# Patient Record
Sex: FEMALE | Race: WHITE | Employment: FULL TIME | URBAN - METROPOLITAN AREA
[De-identification: names, ages, dates, MRNs, and addresses within clinical notes are randomized per-mention and may not be internally consistent; named-entity substitution may affect disease eponyms.]

---

## 2022-10-11 NOTE — PROGRESS NOTES
Annual exam ages 40-58      Candido Wen is a ,  56 y.o. female   No LMP recorded. 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 With regard to the Gardasil vaccine, she is older than the FDA approved age to receive it. Menstrual status:    Her periods are normal in flow. She is using one to three pads or tampons per day, usually regular with a 26-32 day interval with 3-7 day duration. She does not have dysmenorrhea. She reports no premenstrual symptoms. Sounds perimenopausal--does not want medication for that. Contraception:    The current method of family planning is none. Hormonal status:  She reports no perimenstrual type symptoms. She is not having vasomotor symptoms. The patient is not using any ERT. Sexual history:    She  reports being sexually active and has had partner(s) who are male. She reports using the following method of birth control/protection: Surgical.    Medical conditions:    New patient first AE visit in office     Surgical history confirmed with patient. has a past surgical history that includes pr cardiac surg procedure unlist (2012); hx  section; hx tubal ligation; hx tonsillectomy; and hx heent. Pap and Mammogram History:    Her most recent Pap smear was normal, obtained 2 year(s) ago. The patient had her mammogram today in our office. Breast Cancer History/Substance Abuse: negative      Osteoporosis History:    Family history does not include a first or second degree relative with osteopenia or osteoporosis.         Past Medical History:   Diagnosis Date    Anxiety     Arrhythmia     PSVT    GERD (gastroesophageal reflux disease)     Pneumonia      Past Surgical History:   Procedure Laterality Date    HX  SECTION      x 2    HX HEENT      wisdom teeth    HX TONSILLECTOMY      HX TUBAL LIGATION      [place] CARDIAC SURG PROCEDURE UNLIST  2012    ablation Current Outpatient Medications   Medication Sig Dispense Refill    aspirin (ASPIRIN) 325 mg tablet Take 1 Tab by mouth every Monday and Thursday. Indications: MYOCARDIAL INFARCTION, PREVENTION OF TRANSIENT ISCHEMIC ATTACKS 999 Tab 999    mesalamine EC (ASACOL) 400 mg EC tablet Take 3 Tabs by mouth three (3) times daily. 270 Tab 1    Desvenlafaxine SR (PRISTIQ) 50 mg tablet Take 50 mg by mouth daily. ALPRAZolam (XANAX) 0.25 mg tablet Take 0.25 mg by mouth two (2) times daily as needed. esomeprazole (NEXIUM) 40 mg capsule Take 40 mg by mouth daily. Allergies: Patient has no known allergies. Tobacco History:  reports that she has never smoked. She has never used smokeless tobacco.  Alcohol Abuse:  reports current alcohol use. Drug Abuse:  reports no history of drug use. Family Medical/Cancer History:   Family History   Problem Relation Age of Onset    Other Mother         colitis    Heart Disease Father     Stroke Father         Review of Systems - History obtained from the patient  Constitutional: negative for weight loss, fever, night sweats  HEENT: negative for hearing loss, earache, congestion, snoring, sorethroat  CV: negative for chest pain, palpitations, edema  Resp: negative for cough, shortness of breath, wheezing  GI: negative for change in bowel habits, abdominal pain, black or bloody stools  : negative for frequency, dysuria, hematuria, vaginal discharge  MSK: negative for back pain, joint pain, muscle pain  Breast: negative for breast lumps, nipple discharge, galactorrhea  Skin :negative for itching, rash, hives  Neuro: negative for dizziness, headache, confusion, weakness  Psych: negative for anxiety, depression, change in mood  Heme/lymph: negative for bleeding, bruising, pallor    Physical Exam    There were no vitals taken for this visit.     Constitutional  Appearance: well-nourished, well developed, alert, in no acute distress    HENT  Head and Face: appears normal    Neck  Inspection/Palpation: normal appearance, no masses or tenderness  Lymph Nodes: no lymphadenopathy present  Thyroid: gland size normal, nontender, no nodules or masses present on palpation    Chest  Respiratory Effort: breathing unlabored  Auscultation: normal breath sounds    Cardiovascular  Heart:   Auscultation: regular rate and rhythm without murmur    Breasts  Inspection of Breasts: breasts symmetrical, no skin changes, no discharge present, nipple appearance normal, no skin retraction present  Palpation of Breasts and Axillae: no masses present on palpation, no breast tenderness  Axillary Lymph Nodes: no lymphadenopathy present    Gastrointestinal  Abdominal Examination: abdomen non-tender to palpation, normal bowel sounds, no masses present  Liver and spleen: no hepatomegaly present, spleen not palpable  Hernias: no hernias identified    Genitourinary  External Genitalia: normal appearance for age, no discharge present, no tenderness present, no inflammatory lesions present, no masses present, no atrophy present  Vagina: normal vaginal vault without central or paravaginal defects, no discharge present, no inflammatory lesions present, no masses present  Bladder: non-tender to palpation  Urethra: appears normal  Cervix: normal   Uterus: normal size, shape and consistency  Adnexa: no adnexal tenderness present, no adnexal masses present  Perineum: perineum within normal limits, no evidence of trauma, no rashes or skin lesions present  Anus: anus within normal limits, no hemorrhoids present  Inguinal Lymph Nodes: no lymphadenopathy present    Skin  General Inspection: no rash, no lesions identified    Neurologic/Psychiatric  Mental Status:  Orientation: grossly oriented to person, place and time  Mood and Affect: mood normal, affect appropriate    Assessment:  Routine gynecologic examination  Her current medical status is satisfactory with no evidence of significant gynecologic issues.     Plan:  Counseled re: diet, exercise, healthy lifestyle  Return for yearly wellness visits  Rec annual mammogram

## 2022-10-14 ENCOUNTER — OFFICE VISIT (OUTPATIENT)
Dept: OBGYN CLINIC | Age: 47
End: 2022-10-14

## 2022-10-14 VITALS — WEIGHT: 169.2 LBS | SYSTOLIC BLOOD PRESSURE: 144 MMHG | DIASTOLIC BLOOD PRESSURE: 82 MMHG

## 2022-10-14 DIAGNOSIS — Z01.419 ENCOUNTER FOR GYNECOLOGICAL EXAMINATION WITHOUT ABNORMAL FINDING: Primary | ICD-10-CM

## 2022-10-14 DIAGNOSIS — Z12.4 ENCOUNTER FOR SCREENING FOR CERVICAL CANCER: ICD-10-CM

## 2022-10-14 PROCEDURE — 99396 PREV VISIT EST AGE 40-64: CPT | Performed by: OBSTETRICS & GYNECOLOGY

## 2022-10-14 RX ORDER — OMEPRAZOLE 20 MG/1
20 TABLET, DELAYED RELEASE ORAL DAILY
COMMUNITY

## 2022-10-14 RX ORDER — LOSARTAN POTASSIUM 25 MG/1
TABLET ORAL
COMMUNITY
Start: 2022-07-26

## 2022-10-14 RX ORDER — METOPROLOL SUCCINATE 50 MG/1
TABLET, EXTENDED RELEASE ORAL
COMMUNITY
Start: 2022-09-24

## 2022-10-14 RX ORDER — ESCITALOPRAM OXALATE 20 MG/1
TABLET ORAL
COMMUNITY
Start: 2022-07-26

## 2023-11-28 ENCOUNTER — OFFICE VISIT (OUTPATIENT)
Age: 48
End: 2023-11-28
Payer: COMMERCIAL

## 2023-11-28 VITALS — DIASTOLIC BLOOD PRESSURE: 90 MMHG | SYSTOLIC BLOOD PRESSURE: 160 MMHG | WEIGHT: 149.4 LBS

## 2023-11-28 DIAGNOSIS — Z01.419 ENCOUNTER FOR GYNECOLOGICAL EXAMINATION (GENERAL) (ROUTINE) WITHOUT ABNORMAL FINDINGS: Primary | ICD-10-CM

## 2023-11-28 DIAGNOSIS — Z12.4 ENCOUNTER FOR PAPANICOLAOU SMEAR FOR CERVICAL CANCER SCREENING: ICD-10-CM

## 2023-11-28 PROCEDURE — 99396 PREV VISIT EST AGE 40-64: CPT | Performed by: OBSTETRICS & GYNECOLOGY

## 2023-11-28 RX ORDER — VITAMIN B COMPLEX
1 CAPSULE ORAL DAILY
COMMUNITY

## 2023-11-28 RX ORDER — ASPIRIN 81 MG/1
81 TABLET, CHEWABLE ORAL DAILY
COMMUNITY

## 2023-11-28 RX ORDER — CLOBETASOL PROPIONATE 0.5 MG/G
CREAM TOPICAL 2 TIMES DAILY
COMMUNITY

## 2023-11-28 NOTE — PROGRESS NOTES
Miryam James is a 53 y.o. female returns for an annual exam     No chief complaint on file. No LMP recorded. Her periods are absent in flow   Problems: no problems  Birth Control: tubal ligation. Last Pap: normal obtained 1 year(s) ago. She does not have a history of SEYMOUR 2, 3 or cervical cancer. Last Mammogram: had her mammogram today in our office. Last colonoscopy: normal obtained 6 year(s) ago. 1. Have you been to the ER, urgent care clinic, or hospitalized since your last visit? No    2. Have you seen or consulted any other health care providers outside of the [address] [place] [address] since your last visit? No    Examination chaperoned by Hartford Skiff, LPN.
escitalopram oxalate (LEXAPRO) 20 mg tablet (Patient not taking: Reported on 11/28/2023)      losartan (COZAAR) 25 MG tablet ceived the following from [address] [facility]: Outside name: losartan (COZAAR) 25 mg tablet (Patient not taking: Reported on 11/28/2023)      omeprazole (PRILOSEC OTC) 20 MG tablet Take 20 mg by mouth daily (Patient not taking: Reported on 11/28/2023)       No current facility-administered medications for this visit. Allergies: Augmentin [amoxicillin-pot clavulanate] and Doxycycline     Tobacco History:  reports that she has never smoked. She has never used smokeless tobacco.  Alcohol Abuse:  reports current alcohol use. Drug Abuse:  reports no history of drug use.     Family Medical/Cancer History:   Family History   Problem Relation Age of Onset    Other Mother         colitis    Stroke Father     Heart Disease Father         Review of Systems - History obtained from the patient  Constitutional: negative for weight loss, fever, night sweats  HEENT: negative for hearing loss, earache, congestion, snoring, sorethroat  CV: negative for chest pain, palpitations, edema  Resp: negative for cough, shortness of breath, wheezing  GI: negative for change in bowel habits, abdominal pain, black or bloody stools  : negative for frequency, dysuria, hematuria, vaginal discharge  MSK: negative for back pain, joint pain, muscle pain  Breast: negative for breast lumps, nipple discharge, galactorrhea  Skin :negative for itching, rash, hives  Neuro: negative for dizziness, headache, confusion, weakness  Psych: negative for anxiety, depression, change in mood  Heme/lymph: negative for bleeding, bruising, pallor    Physical Exam    BP (!) 160/90   Wt 67.8 kg (149 lb 6.4 oz)     Constitutional  Appearance: well-nourished, well developed, alert, in no acute distress    HENT  Head and Face: appears normal    Neck  Inspection/Palpation: normal appearance, no masses or tenderness  Lymph Nodes: no

## 2023-12-02 LAB
CYTOLOGIST CVX/VAG CYTO: NORMAL
CYTOLOGY CVX/VAG DOC CYTO: NORMAL
CYTOLOGY CVX/VAG DOC THIN PREP: NORMAL
DX ICD CODE: NORMAL
HPV GENOTYPE REFLEX: NORMAL
HPV I/H RISK 4 DNA CVX QL PROBE+SIG AMP: NEGATIVE
Lab: NORMAL
OTHER STN SPEC: NORMAL
STAT OF ADQ CVX/VAG CYTO-IMP: NORMAL

## 2024-12-04 ENCOUNTER — OFFICE VISIT (OUTPATIENT)
Age: 49
End: 2024-12-04
Payer: COMMERCIAL

## 2024-12-04 VITALS
OXYGEN SATURATION: 94 % | BODY MASS INDEX: 23.92 KG/M2 | SYSTOLIC BLOOD PRESSURE: 133 MMHG | RESPIRATION RATE: 16 BRPM | DIASTOLIC BLOOD PRESSURE: 78 MMHG | TEMPERATURE: 97.6 F | WEIGHT: 135 LBS | HEIGHT: 63 IN | HEART RATE: 85 BPM

## 2024-12-04 DIAGNOSIS — Z12.31 ENCOUNTER FOR SCREENING MAMMOGRAM FOR MALIGNANT NEOPLASM OF BREAST: ICD-10-CM

## 2024-12-04 DIAGNOSIS — Z01.419 ENCOUNTER FOR GYNECOLOGICAL EXAMINATION (GENERAL) (ROUTINE) WITHOUT ABNORMAL FINDINGS: Primary | ICD-10-CM

## 2024-12-04 PROCEDURE — 99396 PREV VISIT EST AGE 40-64: CPT | Performed by: OBSTETRICS & GYNECOLOGY

## 2024-12-04 RX ORDER — MESALAMINE 1.2 G/1
1200 TABLET, DELAYED RELEASE ORAL
COMMUNITY

## 2024-12-04 RX ORDER — DULOXETIN HYDROCHLORIDE 30 MG/1
30 CAPSULE, DELAYED RELEASE ORAL DAILY
COMMUNITY

## 2024-12-04 SDOH — ECONOMIC STABILITY: INCOME INSECURITY: HOW HARD IS IT FOR YOU TO PAY FOR THE VERY BASICS LIKE FOOD, HOUSING, MEDICAL CARE, AND HEATING?: NOT HARD AT ALL

## 2024-12-04 SDOH — ECONOMIC STABILITY: FOOD INSECURITY: WITHIN THE PAST 12 MONTHS, YOU WORRIED THAT YOUR FOOD WOULD RUN OUT BEFORE YOU GOT MONEY TO BUY MORE.: NEVER TRUE

## 2024-12-04 SDOH — ECONOMIC STABILITY: FOOD INSECURITY: WITHIN THE PAST 12 MONTHS, THE FOOD YOU BOUGHT JUST DIDN'T LAST AND YOU DIDN'T HAVE MONEY TO GET MORE.: NEVER TRUE

## 2024-12-04 ASSESSMENT — PATIENT HEALTH QUESTIONNAIRE - PHQ9
SUM OF ALL RESPONSES TO PHQ QUESTIONS 1-9: 0
1. LITTLE INTEREST OR PLEASURE IN DOING THINGS: NOT AT ALL
SUM OF ALL RESPONSES TO PHQ9 QUESTIONS 1 & 2: 0
SUM OF ALL RESPONSES TO PHQ QUESTIONS 1-9: 0
SUM OF ALL RESPONSES TO PHQ QUESTIONS 1-9: 0
2. FEELING DOWN, DEPRESSED OR HOPELESS: NOT AT ALL
SUM OF ALL RESPONSES TO PHQ QUESTIONS 1-9: 0

## 2024-12-04 NOTE — PROGRESS NOTES
Autumn Hebert is a 49 y.o. female returns for an annual exam     No chief complaint on file.      No LMP recorded. (Menstrual status: Other - See Notes).  Her periods are absent-post ablation  She does not have dysmenorrhea.  Problems: no problems  Birth Control: tubal ligation.  Last Pap: normal obtained 1 year(s) ago.  She does not have a history of SEYMOUR 2, 3 or cervical cancer.   Last Mammogram: had her mammogram today in our office today.  2023 Mammonormal.   Last Bone Density: Never had obtained   Last colonoscopy: 2024 repeat 5 years    1. Have you been to the ER, urgent care clinic, or hospitalized since your last visit? No    2. Have you seen or consulted any other health care providers outside of the [facility] since your last visit? No    Examination chaperoned by Britta Smith LPN.

## 2024-12-04 NOTE — PROGRESS NOTES
Annual exam      Autumn Hebert is a ,  49 y.o. female   No LMP recorded (lmp unknown). (Menstrual status: Other - See Notes).    She presents for her annual checkup.     She is having no problems.    Menstrual status:    Her periods are absent-post menopause    Sexual history:    She  has no history on file for sexual activity.    Per Nursing Note:  Last Pap: normal obtained 1 year(s) ago.  She does not have a history of SEYMOUR 2, 3 or cervical cancer.   Last Mammogram: had her mammogram today in our office today.   Mammonormal.   Last Bone Density: Never had obtained   Last colonoscopy:  repeat 5 years    Past Medical History:   Diagnosis Date    Anxiety     Arrhythmia 2012    PSVT    GERD (gastroesophageal reflux disease)     Hypertension     Pneumonia      Past Surgical History:   Procedure Laterality Date     SECTION      x 2    HEENT      wisdom teeth    [place] UNLISTED PROCEDURE CARDIAC SURGERY  2012    ablation    TONSILLECTOMY      TUBAL LIGATION         Current Outpatient Medications   Medication Sig Dispense Refill    DULoxetine (CYMBALTA) 30 MG extended release capsule Take 1 capsule by mouth daily      mesalamine (LIALDA) 1.2 g EC tablet Take 1 tablet by mouth daily (with breakfast) Indications: .375 QID      b complex vitamins capsule Take 1 capsule by mouth daily      aspirin 81 MG chewable tablet Take 1 tablet by mouth daily      clobetasol (TEMOVATE) 0.05 % cream Apply topically 2 times daily Apply topically 2 times daily.      esomeprazole (NEXIUM) 40 MG delayed release capsule Take 1 capsule by mouth daily      metoprolol succinate (TOPROL XL) 50 MG extended release tablet ceived the following from [facility]: Outside name: metoprolol succinate (TOPROL-XL) 50 mg XL tablet      aspirin 325 MG tablet Take 1 tablet by mouth (Patient not taking: Reported on 2024)       No current facility-administered medications for this visit.     Allergies: Augmentin

## 2024-12-17 ENCOUNTER — CLINICAL DOCUMENTATION (OUTPATIENT)
Age: 49
End: 2024-12-17

## 2024-12-17 NOTE — PROGRESS NOTES
12/17/24 12:05pm: Records received from Vineet Hu MD at [facility]. Dx: MASLD. Routine appt. Records placed in Sowmya's box

## 2024-12-21 ENCOUNTER — HOSPITAL ENCOUNTER (OUTPATIENT)
Facility: HOSPITAL | Age: 49
Discharge: HOME | End: 2024-12-24
Attending: SPECIALIST
Payer: COMMERCIAL

## 2024-12-21 DIAGNOSIS — R93.5 ABNORMAL CT OF THE ABDOMEN: ICD-10-CM

## 2024-12-21 DIAGNOSIS — K74.60 HEPATIC CIRRHOSIS, UNSPECIFIED HEPATIC CIRRHOSIS TYPE, UNSPECIFIED WHETHER ASCITES PRESENT (HCC): ICD-10-CM

## 2024-12-21 DIAGNOSIS — R74.8 ELEVATED LIVER ENZYMES: ICD-10-CM

## 2024-12-21 PROCEDURE — 74181 MRI ABDOMEN W/O CONTRAST: CPT

## 2025-01-08 ENCOUNTER — CLINICAL DOCUMENTATION (OUTPATIENT)
Age: 50
End: 2025-01-08

## 2025-01-08 NOTE — PROGRESS NOTES
1/8/25 1407: Records received from Dinh Obando MD at [facility]. Dx: Fatty liver, elevated alk phos, elevated alpha 1 antitrypsin. Routine appt per Catarina PEREZ. Records placed in Sowmya's box

## 2025-05-02 ENCOUNTER — OFFICE VISIT (OUTPATIENT)
Age: 50
End: 2025-05-02

## 2025-05-02 VITALS
RESPIRATION RATE: 16 BRPM | WEIGHT: 140.6 LBS | HEIGHT: 63 IN | HEART RATE: 67 BPM | DIASTOLIC BLOOD PRESSURE: 90 MMHG | BODY MASS INDEX: 24.91 KG/M2 | TEMPERATURE: 97.3 F | OXYGEN SATURATION: 98 % | SYSTOLIC BLOOD PRESSURE: 146 MMHG

## 2025-05-02 DIAGNOSIS — L40.9 PSORIASIS: ICD-10-CM

## 2025-05-02 DIAGNOSIS — R74.8 ELEVATED LIVER ENZYMES: Primary | ICD-10-CM

## 2025-05-02 RX ORDER — MESALAMINE 0.38 G/1
CAPSULE, EXTENDED RELEASE ORAL
COMMUNITY
Start: 2025-05-01 | End: 2025-05-02

## 2025-05-02 NOTE — PROGRESS NOTES
Chief Complaint   Patient presents with    New Patient           Vitals:    05/02/25 1453   BP: (!) 146/90   Pulse: 67   Resp: 16   Temp: 97.3 °F (36.3 °C)   SpO2: 98%            1. Have you been to the ER, urgent care clinic since your last visit?  Hospitalized since your last visit?  No  2. Have you seen or consulted any other health care providers outside of the [facility] since your last visit?  Include any pap smears or colon screening. No

## 2025-05-02 NOTE — PROGRESS NOTES
[facility] LIVER [facility] LIVER INSTITUTE [facility]     Prosper Hairston MD, FACP, MACG, FAASLD   MD Zahira Moore, BOBY Her, [facility]-BC   Irena Francissowmyasheila, [facility]  Josh Juan A, FNP-C   Brunilda Abdiaziz, [facility]-BC         Liver [facility]   [address], [address]   [place]  [address] [phone]   FAX: [phone]  Liver [facility]   [address], [address]   [place]  [address] [phone]   FAX: [phone]       Patient Care Team:  Dinh Obando MD as PCP - General (Family Medicine)  Vineet Hu MD (Gastroenterology)      Patient Active Problem List   Diagnosis    Ulcerative colitis (HCC)    GERD (gastroesophageal reflux disease)    Elevated liver enzymes    Psoriasis       The clinicians listed above have asked me to see Autumn Hebert in consultation regarding elevated liver enzymes and its management.      All medical records sent by the referring physicians were reviewed including imaging studies     The patient is a 49 y.o. female who was found to have elevated alkaline phosphatase in 11/2024.      The patient has UC for 13 years and has been treated with mesalamine in the past.  The patient has psoriasis treated with a topical agent.    Serologic evaluation for markers of chronic liver disease was negative.    The patient had been consuming 20 alcohol drinks per week for a few years.    The patient has been abstinent from all alcohol since 1/2024.    The most recent imaging of the liver was MRI performed in 12/2024.  Results suggest steatotic liver disease (SLD).      Assessment of liver fibrosis with Fibroscan was performed in the office today.  The result was 24.4 kPa which correlates with cirrhosis.  The CAP score of 194 suggests there Is no hepatic steatosis.    In the office today the

## 2025-05-03 LAB
ALBUMIN SERPL-MCNC: 4.3 G/DL (ref 3.5–5)
ALBUMIN/GLOB SERPL: 1.4 (ref 1.1–2.2)
ALP SERPL-CCNC: 148 U/L (ref 45–117)
ALT SERPL-CCNC: 30 U/L (ref 12–78)
ANION GAP SERPL CALC-SCNC: 4 MMOL/L (ref 2–12)
AST SERPL-CCNC: 30 U/L (ref 15–37)
BASOPHILS # BLD: 0.04 K/UL (ref 0–0.1)
BASOPHILS NFR BLD: 0.5 % (ref 0–1)
BILIRUB DIRECT SERPL-MCNC: 0.1 MG/DL (ref 0–0.2)
BILIRUB SERPL-MCNC: 0.5 MG/DL (ref 0.2–1)
BUN SERPL-MCNC: 16 MG/DL (ref 6–20)
BUN/CREAT SERPL: 21 (ref 12–20)
CALCIUM SERPL-MCNC: 9.5 MG/DL (ref 8.5–10.1)
CHLORIDE SERPL-SCNC: 102 MMOL/L (ref 97–108)
CO2 SERPL-SCNC: 30 MMOL/L (ref 21–32)
CREAT SERPL-MCNC: 0.75 MG/DL (ref 0.55–1.02)
DIFFERENTIAL METHOD BLD: ABNORMAL
EOSINOPHIL # BLD: 0.3 K/UL (ref 0–0.4)
EOSINOPHIL NFR BLD: 3.7 % (ref 0–7)
ERYTHROCYTE [DISTWIDTH] IN BLOOD BY AUTOMATED COUNT: 15.8 % (ref 11.5–14.5)
GLOBULIN SER CALC-MCNC: 3 G/DL (ref 2–4)
GLUCOSE SERPL-MCNC: 98 MG/DL (ref 65–100)
HCT VFR BLD AUTO: 37.9 % (ref 35–47)
HGB BLD-MCNC: 11.8 G/DL (ref 11.5–16)
IMM GRANULOCYTES # BLD AUTO: 0.02 K/UL (ref 0–0.04)
IMM GRANULOCYTES NFR BLD AUTO: 0.2 % (ref 0–0.5)
INR PPP: 1 (ref 0.9–1.1)
LYMPHOCYTES # BLD: 2.84 K/UL (ref 0.8–3.5)
LYMPHOCYTES NFR BLD: 35.1 % (ref 12–49)
MCH RBC QN AUTO: 23.8 PG (ref 26–34)
MCHC RBC AUTO-ENTMCNC: 31.1 G/DL (ref 30–36.5)
MCV RBC AUTO: 76.6 FL (ref 80–99)
MONOCYTES # BLD: 0.58 K/UL (ref 0–1)
MONOCYTES NFR BLD: 7.2 % (ref 5–13)
NEUTS SEG # BLD: 4.31 K/UL (ref 1.8–8)
NEUTS SEG NFR BLD: 53.3 % (ref 32–75)
NRBC # BLD: 0 K/UL (ref 0–0.01)
NRBC BLD-RTO: 0 PER 100 WBC
PLATELET # BLD AUTO: 202 K/UL (ref 150–400)
PMV BLD AUTO: 11.7 FL (ref 8.9–12.9)
POTASSIUM SERPL-SCNC: 4 MMOL/L (ref 3.5–5.1)
PROT SERPL-MCNC: 7.3 G/DL (ref 6.4–8.2)
PROTHROMBIN TIME: 10.9 SEC (ref 9.2–11.2)
RBC # BLD AUTO: 4.95 M/UL (ref 3.8–5.2)
SODIUM SERPL-SCNC: 136 MMOL/L (ref 136–145)
WBC # BLD AUTO: 8.1 K/UL (ref 3.6–11)

## 2025-05-04 LAB — ACE SERPL-CCNC: 90 U/L (ref 14–82)

## 2025-05-05 LAB — IGG4 SER-MCNC: 2 MG/DL (ref 2–96)

## 2025-05-19 ENCOUNTER — TRANSCRIBE ORDERS (OUTPATIENT)
Facility: HOSPITAL | Age: 50
End: 2025-05-19

## 2025-05-19 DIAGNOSIS — R93.5 ABNORMAL CT OF THE ABDOMEN: Primary | ICD-10-CM

## 2025-05-19 DIAGNOSIS — K51.90 ULCERATIVE COLITIS WITHOUT COMPLICATIONS, UNSPECIFIED LOCATION (HCC): ICD-10-CM

## 2025-05-25 PROBLEM — L40.9 PSORIASIS: Status: ACTIVE | Noted: 2025-05-25

## 2025-05-25 PROBLEM — R74.8 ELEVATED LIVER ENZYMES: Status: ACTIVE | Noted: 2025-05-25

## 2025-05-25 PROBLEM — K50.90 CROHN'S DISEASE (HCC): Status: RESOLVED | Noted: 2025-05-25 | Resolved: 2025-05-25

## 2025-05-25 PROBLEM — K50.90 CROHN'S DISEASE (HCC): Status: ACTIVE | Noted: 2025-05-25

## 2025-06-07 ENCOUNTER — HOSPITAL ENCOUNTER (OUTPATIENT)
Facility: HOSPITAL | Age: 50
Discharge: HOME | End: 2025-06-10
Attending: SPECIALIST
Payer: COMMERCIAL

## 2025-06-07 DIAGNOSIS — K51.90 ULCERATIVE COLITIS WITHOUT COMPLICATIONS, UNSPECIFIED LOCATION (HCC): ICD-10-CM

## 2025-06-07 DIAGNOSIS — R93.5 ABNORMAL CT OF THE ABDOMEN: ICD-10-CM

## 2025-06-07 PROCEDURE — 74177 CT ABD & PELVIS W/CONTRAST: CPT

## 2025-06-07 PROCEDURE — 6360000004 HC RX CONTRAST MEDICATION: Performed by: SPECIALIST

## 2025-06-07 RX ORDER — DIATRIZOATE MEGLUMINE AND DIATRIZOATE SODIUM 660; 100 MG/ML; MG/ML
30 SOLUTION ORAL; RECTAL
Status: DISCONTINUED | OUTPATIENT
Start: 2025-06-07 | End: 2025-06-11 | Stop reason: HOSPADM

## 2025-06-07 RX ORDER — IOPAMIDOL 755 MG/ML
100 INJECTION, SOLUTION INTRAVASCULAR
Status: COMPLETED | OUTPATIENT
Start: 2025-06-07 | End: 2025-06-07

## 2025-06-07 RX ADMIN — DIATRIZOATE MEGLUMINE AND DIATRIZOATE SODIUM 30 ML: 660; 100 LIQUID ORAL; RECTAL at 10:40

## 2025-06-07 RX ADMIN — IOPAMIDOL 100 ML: 755 INJECTION, SOLUTION INTRAVENOUS at 10:40

## 2025-07-15 ENCOUNTER — TRANSCRIBE ORDERS (OUTPATIENT)
Facility: HOSPITAL | Age: 50
End: 2025-07-15

## 2025-07-15 DIAGNOSIS — R74.8 ELEVATED LIVER ENZYMES: Primary | ICD-10-CM

## 2025-07-25 ENCOUNTER — APPOINTMENT (OUTPATIENT)
Facility: HOSPITAL | Age: 50
End: 2025-07-25
Attending: INTERNAL MEDICINE
Payer: COMMERCIAL

## 2025-07-25 ENCOUNTER — HOSPITAL ENCOUNTER (OUTPATIENT)
Facility: HOSPITAL | Age: 50
Setting detail: OUTPATIENT SURGERY
Discharge: HOME | End: 2025-07-25
Attending: INTERNAL MEDICINE | Admitting: INTERNAL MEDICINE
Payer: COMMERCIAL

## 2025-07-25 VITALS
SYSTOLIC BLOOD PRESSURE: 139 MMHG | RESPIRATION RATE: 13 BRPM | DIASTOLIC BLOOD PRESSURE: 98 MMHG | OXYGEN SATURATION: 100 % | WEIGHT: 140 LBS | HEIGHT: 63 IN | BODY MASS INDEX: 24.8 KG/M2 | HEART RATE: 68 BPM

## 2025-07-25 DIAGNOSIS — R74.8 ELEVATED LIVER ENZYMES: ICD-10-CM

## 2025-07-25 PROCEDURE — 88313 SPECIAL STAINS GROUP 2: CPT

## 2025-07-25 PROCEDURE — 7100000011 HC PHASE II RECOVERY - ADDTL 15 MIN: Performed by: INTERNAL MEDICINE

## 2025-07-25 PROCEDURE — 6370000000 HC RX 637 (ALT 250 FOR IP): Performed by: INTERNAL MEDICINE

## 2025-07-25 PROCEDURE — 88307 TISSUE EXAM BY PATHOLOGIST: CPT

## 2025-07-25 PROCEDURE — 6360000002 HC RX W HCPCS: Performed by: INTERNAL MEDICINE

## 2025-07-25 PROCEDURE — 76942 ECHO GUIDE FOR BIOPSY: CPT | Performed by: INTERNAL MEDICINE

## 2025-07-25 PROCEDURE — 76942 ECHO GUIDE FOR BIOPSY: CPT

## 2025-07-25 PROCEDURE — 7100000010 HC PHASE II RECOVERY - FIRST 15 MIN: Performed by: INTERNAL MEDICINE

## 2025-07-25 PROCEDURE — 2709999900 HC NON-CHARGEABLE SUPPLY: Performed by: INTERNAL MEDICINE

## 2025-07-25 PROCEDURE — 3600007502: Performed by: INTERNAL MEDICINE

## 2025-07-25 PROCEDURE — 47000 NEEDLE BIOPSY OF LIVER PERQ: CPT | Performed by: INTERNAL MEDICINE

## 2025-07-25 RX ORDER — FENTANYL CITRATE 50 UG/ML
25 INJECTION, SOLUTION INTRAMUSCULAR; INTRAVENOUS AS NEEDED
Refills: 0 | Status: DISCONTINUED | OUTPATIENT
Start: 2025-07-25 | End: 2025-07-25 | Stop reason: HOSPADM

## 2025-07-25 RX ORDER — MESALAMINE 1.2 G/1
3600 TABLET, DELAYED RELEASE ORAL
COMMUNITY

## 2025-07-25 RX ORDER — SODIUM CHLORIDE 0.9 % (FLUSH) 0.9 %
5-40 SYRINGE (ML) INJECTION PRN
Status: DISCONTINUED | OUTPATIENT
Start: 2025-07-25 | End: 2025-07-25 | Stop reason: HOSPADM

## 2025-07-25 RX ORDER — ONDANSETRON 2 MG/ML
2 INJECTION INTRAMUSCULAR; INTRAVENOUS AS NEEDED
Status: DISCONTINUED | OUTPATIENT
Start: 2025-07-25 | End: 2025-07-25 | Stop reason: HOSPADM

## 2025-07-25 RX ORDER — ASPIRIN 81 MG/1
81 TABLET, CHEWABLE ORAL DAILY
COMMUNITY

## 2025-07-25 RX ORDER — SODIUM CHLORIDE 0.9 % (FLUSH) 0.9 %
5-40 SYRINGE (ML) INJECTION EVERY 12 HOURS SCHEDULED
Status: DISCONTINUED | OUTPATIENT
Start: 2025-07-25 | End: 2025-07-25 | Stop reason: HOSPADM

## 2025-07-25 RX ORDER — ROSUVASTATIN CALCIUM 5 MG/1
5 TABLET, COATED ORAL DAILY
COMMUNITY

## 2025-07-25 RX ORDER — ACETAMINOPHEN 325 MG/1
650 TABLET ORAL ONCE
Status: COMPLETED | OUTPATIENT
Start: 2025-07-25 | End: 2025-07-25

## 2025-07-25 RX ORDER — MIDAZOLAM HYDROCHLORIDE 5 MG/5ML
5 INJECTION, SOLUTION INTRAMUSCULAR; INTRAVENOUS ONCE
Status: DISCONTINUED | OUTPATIENT
Start: 2025-07-25 | End: 2025-07-25 | Stop reason: HOSPADM

## 2025-07-25 RX ORDER — SODIUM CHLORIDE 9 MG/ML
INJECTION, SOLUTION INTRAVENOUS PRN
Status: DISCONTINUED | OUTPATIENT
Start: 2025-07-25 | End: 2025-07-25 | Stop reason: HOSPADM

## 2025-07-25 RX ADMIN — FENTANYL CITRATE 25 MCG: 50 INJECTION INTRAMUSCULAR; INTRAVENOUS at 09:22

## 2025-07-25 RX ADMIN — ACETAMINOPHEN 650 MG: 325 TABLET ORAL at 09:01

## 2025-07-25 ASSESSMENT — PAIN SCALES - GENERAL
PAINLEVEL_OUTOF10: 7
PAINLEVEL_OUTOF10: 6

## 2025-07-25 ASSESSMENT — PAIN DESCRIPTION - LOCATION: LOCATION: FLANK

## 2025-07-25 ASSESSMENT — PAIN DESCRIPTION - ORIENTATION: ORIENTATION: RIGHT

## 2025-07-25 NOTE — PROGRESS NOTES
Endoscopy recovery  Patient returned to baseline, vital signs stable (see vital sign flowsheet). Patient offered liquids and tolerated well. Respiratory status within defined limits. Abdomen soft not tender. Skin with in defined limits. Responsible party driving patient home was given the opportunity to ask questions. Patient discharged with documented belongings. Discharge instructions reviewed and print out given.  Patient verbalized understanding. Site checked, no oozing or shadowing seen.

## 2025-07-25 NOTE — OP NOTE
LIVER INSTITUTE [facility]  [address], [address]  [place]  [address]  [phone]  FAX: [phone]     Prosper Hairston MD, [facility], [facility], [facility]   MD Zahira Moore PA-C April S Ashworth, AGPCNP-BC   Irena Puga, ACNPC-A    Liver Transplant and Liver Cancer Coordination:   MELISSA Ramirez, MELISSA Pascual RN    Clinical Research Coordination:   Zahira Yost, MELISSA Gallagher, MELISSA Feldman, MELISSA Jackson RN       LIVER BIOPSY PROCEDURE NOTE    NAME: Autumn Hebert  :  1975  MRN:  [id number]    INDICATIONS/PRE-OPERATIVE  DIAGNOSIS:  Elevated liver transaminases     : Prosper Hairston MD    SURGICAL ASSISTANT:  None    PROSTHETIC DEVICES, TISSUE GRAFTS, TRANSPLANTED ORGANS:  Not applicable    SEDATION: 1% Lidocaine injection 20 ml    PROCEDURE:  Informed consent to perform the procedure was obtained from the patient.  The patient was positioned on the edge of the stretcher lying flat in the supine position.  Ultrasound was utilized to image the liver.  The diaphragm and any major mass lesion or vascular structures within the liver were identified.  An appropriate site for liver biopsy was identified.  The distance from the surface of the skin to the liver capsule was 3 cm.  Images were obtained electronically and stored in the EMR.  This area was prepped with betadine and draped in sterile fashion.  The skin was infiltrated with 1% lidocaine.  The deeper subcutanous tissues and liver capsule overlying the biopsy site were then infiltrated with 1% lidocaine until appropriate anesthesia was obtained.  A small incision was made in the skin so the biopsy devise could be easily inserted.  A total of 2 passes with the 16 gauge Bard biopsy devise was then made into the liver.  Core(s) of liver tissue totaling 4 cm in length were obtained and placed into tissue fixative.  A band aid was placed

## 2025-07-25 NOTE — H&P
LIVER INSTITUTE [facility]  [address], [address]  [place] [id number] [phone]  FAX: [phone]     Prosper Hairston MD, FACP, MACG, FAASLD   MD Zahira Moore, BOBY Her, [facility]-BC   Irenaori Francisanthony, ACNPC-A    Liver Transplant and Liver Cancer Coordination:   Jacquelin Steele, MELISSA Mendoza, MELISSA Pascual, MELISSA    Clinical Research Coordination:   Zahira Yost, MELISSA Gallagher, MELISSA Feldman, MELISSA Jackson, MELISSA       PRE-PROCEDURE NOTE - LIVER BIOPSY    H and P from last office visit reviewed.    Allergies reviewed.  Out-patient medication list reviewed.    Patient Active Problem List   Diagnosis    Ulcerative colitis (HCC)    GERD (gastroesophageal reflux disease)    Elevated liver enzymes    Psoriasis       Allergies   Allergen Reactions    Augmentin [Amoxicillin-Pot Clavulanate] Diarrhea    Doxycycline        No current facility-administered medications on file prior to encounter.     Current Outpatient Medications on File Prior to Encounter   Medication Sig Dispense Refill    mesalamine (LIALDA) 1.2 g EC tablet Take 3 tablets by mouth daily (with breakfast)      aspirin 81 MG chewable tablet Take 1 tablet by mouth daily      rosuvastatin (CRESTOR) 5 MG tablet Take 1 tablet by mouth daily      Multiple Vitamin (MULTIVITAMIN PO) Take by mouth      DULoxetine (CYMBALTA) 30 MG extended release capsule Take 1 capsule by mouth daily      esomeprazole (NEXIUM) 40 MG delayed release capsule Take 1 capsule by mouth daily      metoprolol succinate (TOPROL XL) 50 MG extended release tablet ceived the following from [facility]: Outside name: metoprolol succinate (TOPROL-XL) 50 mg XL tablet      VITAMIN D PO Take by mouth Every other day      clobetasol (TEMOVATE) 0.05 % cream Apply topically 2 times daily Apply topically 2 times daily.         For liver biopsy to assess Elevated liver

## 2025-07-25 NOTE — DISCHARGE INSTRUCTIONS
LIVER [facility]  [address], [address]  [place], [place]  [address] [phone]  FAX: [phone]     Prosper Hairston MD, FACP, [facility], FAAS   MD Zahira Moore PA-C April S Ashworth, [facility]-BC   Irena Schafersheila, ACNPC-A    Liver Transplant and Liver Cancer Coordination:   Jacquelin Steele, MELISSA Mendoza, MELISSA Pascual, MELISSA    Clinical Research Coordination:   Zahira Yost, MELISSA Gallagher, MELISSA Feldman, MELISSA Jackson, MELISSA       LIVER BIOPSY DISCHARGE INSTRUCTIONS      Autumn Hebert  1975  Date: 7/25/2025    DIET:    You may resume your previous diet.    ACTIVITIES:  Rest quietly the rest of today.  You should not lift any objects more than 20 pounds for the next 2 days.  If you work sitting down without strenuous activity you may return to work tomorrow.  If you exert yourself or do heavy lifting at work you should take tomorrow off.  Do not drive or operate hazardous machinery for 12 hours after you are discharged from this procedure.    SPECIAL INSTRUCTIONS:  Do not use any aspirin or non-steroidal (Motin, Advil, Naproxen, etc) pain medications for the next 2 days.  You may use extra-strength Tylenol (acetaminophen) if you experience pain or discomfort later today.     Restarting blood thinners:  If you were taking blood thinners prior to the procedure you can restart these in 2 days.    Call the Liver [facility] office if you experience any of the following:  Persistent or severe abdominal pain.  Persistent or severe abdominal distention.  Fever and chills   Nausea and vomiting.  New or unusual symptoms.    Follow-up care:  You should have a follow up appointment with Dr. Hairston to review the results of the liver biopsy results in 2 weeks.  If you do not have an appointment please call the office at the number listed above to schedule this.    Other instructions:   If you have any problems or

## 2025-08-08 ENCOUNTER — OFFICE VISIT (OUTPATIENT)
Age: 50
End: 2025-08-08
Payer: COMMERCIAL

## 2025-08-08 VITALS
HEART RATE: 69 BPM | OXYGEN SATURATION: 97 % | BODY MASS INDEX: 25.05 KG/M2 | SYSTOLIC BLOOD PRESSURE: 116 MMHG | HEIGHT: 63 IN | TEMPERATURE: 98.5 F | WEIGHT: 141.4 LBS | DIASTOLIC BLOOD PRESSURE: 85 MMHG

## 2025-08-08 DIAGNOSIS — K70.30 ALCOHOLIC CIRRHOSIS OF LIVER WITHOUT ASCITES (HCC): Primary | ICD-10-CM

## 2025-08-08 DIAGNOSIS — K70.9 ALCOHOL INDUCED LIVER DISORDER: ICD-10-CM

## 2025-08-08 DIAGNOSIS — F10.91 ALCOHOL USE DISORDER IN REMISSION: ICD-10-CM

## 2025-08-08 PROCEDURE — 99214 OFFICE O/P EST MOD 30 MIN: CPT | Performed by: INTERNAL MEDICINE

## 2025-08-08 ASSESSMENT — PATIENT HEALTH QUESTIONNAIRE - PHQ9
SUM OF ALL RESPONSES TO PHQ QUESTIONS 1-9: 0
DEPRESSION UNABLE TO ASSESS: FUNCTIONAL CAPACITY MOTIVATION LIMITS ACCURACY
SUM OF ALL RESPONSES TO PHQ QUESTIONS 1-9: 0
2. FEELING DOWN, DEPRESSED OR HOPELESS: NOT AT ALL
SUM OF ALL RESPONSES TO PHQ QUESTIONS 1-9: 0
SUM OF ALL RESPONSES TO PHQ QUESTIONS 1-9: 0
1. LITTLE INTEREST OR PLEASURE IN DOING THINGS: NOT AT ALL

## 2025-08-25 PROBLEM — K74.60 CIRRHOSIS (HCC): Status: ACTIVE | Noted: 2025-08-25

## 2025-08-25 PROBLEM — F10.91 ALCOHOL USE DISORDER IN REMISSION: Status: ACTIVE | Noted: 2025-08-25

## 2025-08-25 PROBLEM — K70.9 ALCOHOL INDUCED LIVER DISORDER: Status: ACTIVE | Noted: 2025-08-25

## (undated) DEVICE — MAX-CORE® DISPOSABLE CORE BIOPSY INSTRUMENT, 16G X 16CM: Brand: MAX-CORE

## (undated) DEVICE — TRAY BX SFT TISS W/ RUL ALC PREP PD FEN DRP TWL LUERLOCK